# Patient Record
Sex: MALE | Race: BLACK OR AFRICAN AMERICAN | NOT HISPANIC OR LATINO | URBAN - METROPOLITAN AREA
[De-identification: names, ages, dates, MRNs, and addresses within clinical notes are randomized per-mention and may not be internally consistent; named-entity substitution may affect disease eponyms.]

---

## 2020-02-26 ENCOUNTER — EMERGENCY (EMERGENCY)
Facility: HOSPITAL | Age: 65
LOS: 1 days | Discharge: HOME | End: 2020-02-26
Attending: EMERGENCY MEDICINE | Admitting: EMERGENCY MEDICINE
Payer: MEDICARE

## 2020-02-26 VITALS
OXYGEN SATURATION: 99 % | RESPIRATION RATE: 20 BRPM | WEIGHT: 175.05 LBS | TEMPERATURE: 98 F | SYSTOLIC BLOOD PRESSURE: 116 MMHG | HEART RATE: 108 BPM | DIASTOLIC BLOOD PRESSURE: 85 MMHG | HEIGHT: 72 IN

## 2020-02-26 VITALS
DIASTOLIC BLOOD PRESSURE: 72 MMHG | RESPIRATION RATE: 18 BRPM | SYSTOLIC BLOOD PRESSURE: 118 MMHG | TEMPERATURE: 99 F | HEART RATE: 92 BPM | OXYGEN SATURATION: 97 %

## 2020-02-26 DIAGNOSIS — R07.89 OTHER CHEST PAIN: ICD-10-CM

## 2020-02-26 DIAGNOSIS — R07.9 CHEST PAIN, UNSPECIFIED: ICD-10-CM

## 2020-02-26 DIAGNOSIS — R07.0 PAIN IN THROAT: ICD-10-CM

## 2020-02-26 DIAGNOSIS — R06.02 SHORTNESS OF BREATH: ICD-10-CM

## 2020-02-26 DIAGNOSIS — Z87.891 PERSONAL HISTORY OF NICOTINE DEPENDENCE: ICD-10-CM

## 2020-02-26 LAB
ALBUMIN SERPL ELPH-MCNC: 3.4 G/DL — SIGNIFICANT CHANGE UP (ref 3.3–5)
ALP SERPL-CCNC: 60 U/L — SIGNIFICANT CHANGE UP (ref 40–120)
ALT FLD-CCNC: 9 U/L — LOW (ref 10–45)
ANION GAP SERPL CALC-SCNC: 14 MMOL/L — SIGNIFICANT CHANGE UP (ref 5–17)
AST SERPL-CCNC: 10 U/L — SIGNIFICANT CHANGE UP (ref 10–40)
BASOPHILS # BLD AUTO: 0.02 K/UL — SIGNIFICANT CHANGE UP (ref 0–0.2)
BASOPHILS NFR BLD AUTO: 0.2 % — SIGNIFICANT CHANGE UP (ref 0–2)
BILIRUB SERPL-MCNC: 0.4 MG/DL — SIGNIFICANT CHANGE UP (ref 0.2–1.2)
BUN SERPL-MCNC: 15 MG/DL — SIGNIFICANT CHANGE UP (ref 7–23)
CALCIUM SERPL-MCNC: 10.4 MG/DL — SIGNIFICANT CHANGE UP (ref 8.4–10.5)
CHLORIDE SERPL-SCNC: 102 MMOL/L — SIGNIFICANT CHANGE UP (ref 96–108)
CO2 SERPL-SCNC: 22 MMOL/L — SIGNIFICANT CHANGE UP (ref 22–31)
CREAT SERPL-MCNC: 0.84 MG/DL — SIGNIFICANT CHANGE UP (ref 0.5–1.3)
EOSINOPHIL # BLD AUTO: 0.04 K/UL — SIGNIFICANT CHANGE UP (ref 0–0.5)
EOSINOPHIL NFR BLD AUTO: 0.5 % — SIGNIFICANT CHANGE UP (ref 0–6)
GLUCOSE SERPL-MCNC: 105 MG/DL — HIGH (ref 70–99)
HCT VFR BLD CALC: 39.7 % — SIGNIFICANT CHANGE UP (ref 39–50)
HGB BLD-MCNC: 12.4 G/DL — LOW (ref 13–17)
IMM GRANULOCYTES NFR BLD AUTO: 0.3 % — SIGNIFICANT CHANGE UP (ref 0–1.5)
LYMPHOCYTES # BLD AUTO: 0.96 K/UL — LOW (ref 1–3.3)
LYMPHOCYTES # BLD AUTO: 11.1 % — LOW (ref 13–44)
MCHC RBC-ENTMCNC: 28 PG — SIGNIFICANT CHANGE UP (ref 27–34)
MCHC RBC-ENTMCNC: 31.2 GM/DL — LOW (ref 32–36)
MCV RBC AUTO: 89.6 FL — SIGNIFICANT CHANGE UP (ref 80–100)
MONOCYTES # BLD AUTO: 1.14 K/UL — HIGH (ref 0–0.9)
MONOCYTES NFR BLD AUTO: 13.2 % — SIGNIFICANT CHANGE UP (ref 2–14)
NEUTROPHILS # BLD AUTO: 6.43 K/UL — SIGNIFICANT CHANGE UP (ref 1.8–7.4)
NEUTROPHILS NFR BLD AUTO: 74.7 % — SIGNIFICANT CHANGE UP (ref 43–77)
NRBC # BLD: 0 /100 WBCS — SIGNIFICANT CHANGE UP (ref 0–0)
PLATELET # BLD AUTO: 297 K/UL — SIGNIFICANT CHANGE UP (ref 150–400)
POTASSIUM SERPL-MCNC: 4.7 MMOL/L — SIGNIFICANT CHANGE UP (ref 3.5–5.3)
POTASSIUM SERPL-SCNC: 4.7 MMOL/L — SIGNIFICANT CHANGE UP (ref 3.5–5.3)
PROT SERPL-MCNC: 7.6 G/DL — SIGNIFICANT CHANGE UP (ref 6–8.3)
RBC # BLD: 4.43 M/UL — SIGNIFICANT CHANGE UP (ref 4.2–5.8)
RBC # FLD: 13.6 % — SIGNIFICANT CHANGE UP (ref 10.3–14.5)
SODIUM SERPL-SCNC: 138 MMOL/L — SIGNIFICANT CHANGE UP (ref 135–145)
WBC # BLD: 8.62 K/UL — SIGNIFICANT CHANGE UP (ref 3.8–10.5)
WBC # FLD AUTO: 8.62 K/UL — SIGNIFICANT CHANGE UP (ref 3.8–10.5)

## 2020-02-26 PROCEDURE — 99284 EMERGENCY DEPT VISIT MOD MDM: CPT

## 2020-02-26 PROCEDURE — 80053 COMPREHEN METABOLIC PANEL: CPT

## 2020-02-26 PROCEDURE — 83880 ASSAY OF NATRIURETIC PEPTIDE: CPT

## 2020-02-26 PROCEDURE — 71045 X-RAY EXAM CHEST 1 VIEW: CPT

## 2020-02-26 PROCEDURE — 71045 X-RAY EXAM CHEST 1 VIEW: CPT | Mod: 26

## 2020-02-26 PROCEDURE — 71275 CT ANGIOGRAPHY CHEST: CPT

## 2020-02-26 PROCEDURE — 71275 CT ANGIOGRAPHY CHEST: CPT | Mod: 26

## 2020-02-26 PROCEDURE — 93010 ELECTROCARDIOGRAM REPORT: CPT

## 2020-02-26 PROCEDURE — 99284 EMERGENCY DEPT VISIT MOD MDM: CPT | Mod: 25

## 2020-02-26 PROCEDURE — 93005 ELECTROCARDIOGRAM TRACING: CPT

## 2020-02-26 PROCEDURE — 85025 COMPLETE CBC W/AUTO DIFF WBC: CPT

## 2020-02-26 PROCEDURE — 36415 COLL VENOUS BLD VENIPUNCTURE: CPT

## 2020-02-26 PROCEDURE — 84484 ASSAY OF TROPONIN QUANT: CPT

## 2020-02-26 RX ORDER — OXYCODONE HYDROCHLORIDE 5 MG/1
10 TABLET ORAL ONCE
Refills: 0 | Status: DISCONTINUED | OUTPATIENT
Start: 2020-02-26 | End: 2020-02-26

## 2020-02-26 RX ADMIN — OXYCODONE HYDROCHLORIDE 10 MILLIGRAM(S): 5 TABLET ORAL at 19:07

## 2020-02-26 NOTE — ED PROVIDER NOTE - OBJECTIVE STATEMENT
Pt w/ PMHx NSC Lung CA on L on chemo (next 3/3), s/p RT, w/ known mets to brain s/p gamma knife tx, adrenal mets s/p adrenalectomy, as well as bone mets(cervical spine), COPD, BPH p/w CP. Onc is Dr Owens at [facility]. Pt reports on 1/27 he had bronchoscopy and bx at [facility]. Pt reports "they messed up my vocal cords," and is supposed to see ENT for this. Since then, pt has been having throat and diffuse chest pain. The chest pain is diffuse, constant, w/o clear provocations or ameliorations. Pt cannot describe the pain. Chronic, baseline SOB, unchanged. No associated diaphoresis, n/v, palpitations, syncope, nor near syncope. Pt is not continuing to smoke. Pt was taking Oxycodone 10 mg for pain, which worked, but he ran out. Pt was also given Morphine PO, but states that doesn't work as well. Pt is asking for pain medicine. No f/c, cough, nor hemoptysis. No LE edema, calf pain. No hx CAD nor CHF. No hx PE / DVT. Pt brings up medical records from the internet showing:    CTA chest PE study 1/17/20: Increasing subcarinal probable adenopathy and L hilar adenopathy when compared to prior study. There is near complete obstruction of the L mainstem bronchus, worse compared to prior study. Worsening retained secretions seen within the L main bronchi. Severe centrilobular emphysema noted in the R lung. 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 Again the L mainstem bronchus is completely encompassed by this tumor. There is near complete loss of caliber of L mainstem bronchus, with still aeration of L lung. No definite evidence of PE.     CXR 1/27/20: Interval stenting of the L mainstem bronchus which appears to be in good position. Small L pleural effusion. No PTX. Stable L apical opacity possibly representing post radiation changes. Increased L basilar opacity could represent pleural effusion induced atelectasis. PNA cannot be excluded. Clear R lung. Normal heart size allowing for architectural distortion. Superior mediastrinum continues to be obscured.

## 2020-02-26 NOTE — ED PROVIDER NOTE - PROGRESS NOTE DETAILS
Called pt's onc Dr SUNITHA Owens [phone] at [facility], pending call back via answering service D/w Onc fellow- requested CT report to be forwarded to their office. Recommends f/u w/ Dr Owens tomorrow. ? PE on CT appears to be more related to mass, rather than PE. Pt reports chronic sx since bronchoscopy last month, and CT at that time showed no PE. Given potential risk of bleeding with type of mass as well, chronic sx, sx improved w/ Oxycodone, no tachycardia / hypoxia on exam (initial tachycardia resolved), and close f/u arranged, will defer to pt's oncologist. D/w pt and gave careful instructions to f/u w/ his oncologist tomorrow. This report was requested by: Savita Burkett | Reference #: [id number] D/w Onc fellow Dr Hensley- requested CT report to be forwarded to their office. Recommends f/u w/ Dr Owens tomorrow. ? PE on CT appears to be more related to mass, rather than PE. Clinically, does not appear c/w acute pt. Pt reports chronic sx since bronchoscopy last month, and CT at that time showed no PE. Given potential risk of bleeding with type of mass as well, chronic sx, sx improved w/ Oxycodone, no tachycardia / hypoxia on exam (initial tachycardia resolved), and w/ close f/u arranged, will defer to pt's oncologist. D/w pt and gave careful instructions to f/u w/ his oncologist tomorrow. This report was requested by: Savita Burkett | Reference #: [id number]. Multiple narcotic Rx prescriptions, but none current. Pt previously on Hydrocodone - APAP and Morphine, will give Rx for the Hydrocodone.

## 2020-02-26 NOTE — ED ADULT TRIAGE NOTE - CHIEF COMPLAINT QUOTE
Pt BIBA from home CO SOB and Cough.  Mask applied.  Pt endorses Hx of stage IV Lung CA, not currently seeking treatment.  EKG in progress.  PT denies N/V/d, Fevers, CP and Dizziness.

## 2020-02-26 NOTE — ED PROVIDER NOTE - CHIEF COMPLAINT
The patient is a 64y Male complaining of shortness of breath. The patient is a 64y Male complaining of chest pain

## 2020-02-26 NOTE — ED PROVIDER NOTE - NSFOLLOWUPINSTRUCTIONS_ED_ALL_ED_FT
You were evaluated in the ED for chest pain, which you reported to be present since your bronchoscopy and biopsy on 1/27/20 at [facility]. You reported you ran out of oxycodone for your pain. Your blood work and EKG did not show any acute abnormalities. You had a CT angio of the chest today which showed your known lung cancer. Today's reports were compared to the reports you showed in the ED today. There is question whether you may also have a blood clot, versus delayed blood flow secondary to the mass. Given your clinical picture, and prior imaging, the latter was felt to be the more likely diagnosis.     YOU ARE NOT BEING TREATED FOR A BLOOD CLOT AT THIS TIME. IT IS EXTREMELY IMPORTANT YOU CALL DR BRODY TOMORROW FIRST THING IN THE MORNING TO DISCUSS THIS. IT IS EXTREMELY IMPORTANT YOU KEEP YOUR APPOINTMENT WITH HIM SCHEDULED FOR 3/3, OR EARLIER. YOUR CARE WAS DISCUSSED WITH DR SOLOMON, THE PULMONARY FELLOW, AND THESE RESULTS WERE FORWARDED TO HER, AND ARE INCLUDED IN THESE INSTRUCTIONS. PLEASE BRING THEM TO DR BRODY. RETURN TO THE ED FOR WORSENING SHORTNESS OF BREATH, CHEST PAIN, COUGHING UP BLOOD, FAINTING, OR OTHER CONCERNING SYMPTOMS.     You are being prescribed Oxycodone for your pain.    Chest Pain    Chest pain can be caused by many different conditions which may or may not be dangerous. Causes include heartburn, lung infections, heart attack, blood clot in lungs, skin infections, strain or damage to muscle, cartilage, or bones, etc. In addition to a history and physical examination, an electrocardiogram (ECG) or other lab tests may have been performed to determine the cause of your chest pain. Follow up with your primary care provider or with a cardiologist as instructed.     SEEK IMMEDIATE MEDICAL CARE IF YOU HAVE ANY OF THE FOLLOWING SYMPTOMS: worsening chest pain, coughing up blood, unexplained back/neck/jaw pain, severe abdominal pain, dizziness or lightheadedness, fainting, shortness of breath, sweaty or clammy skin, vomiting, or racing heart beat. These symptoms may represent a serious problem that is an emergency. Do not wait to see if the symptoms will go away. Get medical help right away. Call 911 and do not drive yourself to the hospital.

## 2020-02-26 NOTE — ED PROVIDER NOTE - PATIENT PORTAL LINK FT
You can access the FollowMyHealth Patient Portal offered by [facility] by registering at the following website: [email or web address]. By joining [facility]’s FollowMyHealth portal, you will also be able to view your health information using other applications (apps) compatible with our system.

## 2020-02-26 NOTE — ED PROVIDER NOTE - CLINICAL SUMMARY MEDICAL DECISION MAKING FREE TEXT BOX
Pt p/w diffuse chest pain since bronchoscopy 1/27. Pt very poor historian, with able to present some info from [facility] to aid understanding of current dx, tx, and recent testing. Pain appears related to bronch, and appears chronic, and w/o changes in breathing. Pt seems most concerned w/ running out of pain medications and requesting pain medication. No hypoxia, SOB, or sustained tachycardia. Recent CTA chest neg for PE, but sig for large mass, and s/p recent bronchial stent placement. Low suspicion pneumomediastinum or other injury from this procedure given 1 month ago. Likely pain from malignancy. Low suspicion ACS. There are no EKG changes to suggest ischemia, infarction, or pericarditis. H&P is not c/w dissection. Will check labs, EKG, CTA chest. Analgesia. Dispo pending w/u and clinical status

## 2020-02-26 NOTE — ED PROVIDER NOTE - PHYSICAL EXAMINATION
Constitutional: Well appearing, well nourished, awake, alert, oriented to person, place, time/situation and in no apparent distress. Non toxic appearing  ENMT: Airway patent. Normal MM. No erythema or exudates in oropharynx. No tongue / lip / uvula / pharyngeal / sublingual edema. No oral lesions. Uvula is midline. No drooling or stridor. Mild hoarseness. No thrush  Eyes: Clear bilaterally  Cardiac: Normal rate, regular rhythm.  Heart sounds S1, S2.  No murmurs, rubs or gallops. No JVD or LE edema  Respiratory: Breaths sounds equal. Mild diffuse wheezing. No R/R. No increased WOB, tachypnea, hypoxia, or accessory mm use. Pt speaks in full sentences.   Gastrointestinal: Abd soft, NT, ND, NABS. No guarding, rebound, or rigidity. No pulsatile abdominal masses. No organomegaly appreciated. No CVAT   Musculoskeletal: Range of motion is not limited. No calf ttp  Neuro: Alert and oriented x 3, face symmetric and speech fluent. Strength 5/5 x 4 ext and symmetric, nml gross motor movement, nml gait. No focal deficits noted.  Skin: Skin normal color for race, warm, dry and intact. No evidence of rash.  Psych: Alert and oriented to person, place, time/situation. normal mood and affect. no apparent risk to self or others.

## 2020-02-26 NOTE — ED ADULT NURSE NOTE - NSIMPLEMENTINTERV_GEN_ALL_ED
Implemented All Universal Safety Interventions:  [place] to call system. Call bell, personal items and telephone within reach. Instruct patient to call for assistance. Room bathroom lighting operational. Non-slip footwear when patient is off stretcher. Physically safe environment: no spills, clutter or unnecessary equipment. Stretcher in lowest position, wheels locked, appropriate side rails in place.

## 2020-02-26 NOTE — ED PROVIDER NOTE - NS ED ROS FT
Constitutional: No fever or chills.   Eyes: No pain, blurry vision, or discharge.  ENMT: No hearing changes, pain, discharge or infections. No neck pain or stiffness.  Cardiac: See HPI  Respiratory: See HPI  GI: No nausea, vomiting, diarrhea or abdominal pain.  : No dysuria, frequency or burning.  MS: No myalgia, muscle weakness, joint pain or back pain.  Neuro: No headache or weakness. No LOC.  Skin: No skin rash.   Endocrine: No history of thyroid disease or diabetes.  Except as documented in the HPI, all other systems are negative.